# Patient Record
Sex: MALE | Race: WHITE
[De-identification: names, ages, dates, MRNs, and addresses within clinical notes are randomized per-mention and may not be internally consistent; named-entity substitution may affect disease eponyms.]

---

## 2018-01-25 ENCOUNTER — HOSPITAL ENCOUNTER (EMERGENCY)
Dept: HOSPITAL 25 - ED | Age: 23
Discharge: HOME | End: 2018-01-25
Payer: SELF-PAY

## 2018-01-25 VITALS — DIASTOLIC BLOOD PRESSURE: 65 MMHG | SYSTOLIC BLOOD PRESSURE: 125 MMHG

## 2018-01-25 DIAGNOSIS — S68.110A: Primary | ICD-10-CM
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
## 2018-01-25 PROCEDURE — 96375 TX/PRO/DX INJ NEW DRUG ADDON: CPT

## 2018-01-25 PROCEDURE — 99283 EMERGENCY DEPT VISIT LOW MDM: CPT

## 2018-01-25 PROCEDURE — 96374 THER/PROPH/DIAG INJ IV PUSH: CPT

## 2018-01-25 NOTE — HP
PREOPERATIVE HISTORY AND PHYSICAL:

 

DATE OF SURGERY/ADMISSION:  01/26/18

 

DATE OF OFFICE VISIT/ENCOUNTER:  01/25/18

 

ATTENDING SURGEON:  Citlali Baldwin MD * (DICTATED BY ALISE TUCKER)

 

PROCEDURE:  Right index finger revision amputation.

 

CHIEF COMPLAINT:  Traumatic partial amputation, right index finger.

 

HISTORY OF PRESENT ILLNESS:  This is a 22-year-old male who sustained injury to 
his right index finger while he was at work this morning, 01/25/18.  He was 
seen at [facility] after the injury.  The patient 
works on a farm and he accidently cut his right index finger partially off with 
a saw.  He had x- rays that show a complete loss of his distal phalanx.  He was 
given a dose of antibiotics at the emergency room and referred to Dr. Baldwin for 
further evaluation and treatment.  He was prescribed Keflex and oxycodone.  
After exam by and discussion with Dr. Baldwin, the patient has consented to 
proceed with surgical intervention at this time in form of a right index finger 
revision amputation.

 

PAST MEDICAL HISTORY:  History of back injury.

 

PAST SURGICAL HISTORY:  None.

 

CURRENT MEDICATIONS:  None.

 

ALLERGIES:  No known drug allergies.

 

FAMILY MEDICAL HISTORY:  Significant for history of blood clots and stroke.

 

SOCIAL HISTORY:  The patient is employed as a farmer and also with the 
auxiliary police.  He denies tobacco use or recreational drug use.  He drinks 
alcohol on rare occasion.

 

REVIEW OF SYSTEMS:  General:  Negative for fevers, chills, or night sweats.  No 
known anesthesia problems.  HEENT:  Negative for headache, lightheadedness, or 
syncopal episodes.  Integumentary:  Negative for abrasions, lesions, or open 
wounds.  Cardiothoracic:  Negative for hypertension, chest pain, palpitations, 
or edema.  Pulmonary: Negative for shortness of breath with exertion, chronic 
cough, COPD.  GI:  Negative for nausea, vomiting, diarrhea, constipation, or 
GERD.  : Negative for nocturia, urinary frequency, urgency, history of UTIs, 
or kidney problems.  Musculoskeletal:  Positive for current complaint. Positive 
for some recurring back pain. Neurological: Negative for paresthesias, numbness
, history of seizure, stroke, or epilepsy.  Endocrine:  Negative for diabetes 
or thyroid issues.  Hematologic:  Negative for easy bruising, anemia, excessive 
bleeding, or history of DVT.  Infectious Disease:  Negative for history of MRSA
, hepatitis C, or HIV.

 

                               PHYSICAL EXAMINATION

 

GENERAL:  Well-developed, well-nourished 22-year-old male in no acute distress.

 

VITAL SIGNS:  Height 5 feet 8 inches, weight 160 pounds, pulse rate 84.

 

HEENT:  Normocephalic, atraumatic.  Pupils are equal, round, and reactive to 
light and accommodation. Extraocular movements are intact.  Throat is clear.

 

NECK:  Supple. No palpable lymph nodes.

 

PULMONARY:  Lungs are clear to auscultation bilaterally.  No wheezes, rales, or 
rhonchi.

 

CARDIOTHORACIC:  Regular rate and rhythm.  S1, S2.  No murmurs, rubs, or 
gallops. No edema.

 

ABDOMEN:  Positive bowel sounds, soft, nontender.

 

NEUROLOGICAL:  Alert and oriented x3.  Cranial nerves II through XII are 
intact. Sensation is intact to light touch.

 

MUSCULOSKELETAL:  On exam of his right index finger, he has an oblique 
laceration starting at the radial aspect of the PIP joint and at the DIP joint.
  It is a clean cut.  There is exposed bone.  It is very tender to palpation.

 

 IMAGING STUDIES:  X-rays of the right index finger show complete loss of a 
distal phalanx.

 

IMPRESSION:  Right index finger traumatic pressure amputation.

 

PLAN:  The patient is scheduled to undergo a right index finger revision 
amputation with Dr. Baldwin on 01/26/18.  He will return to the office 10 to 14 
days postop for followup.  He has prescriptions from the emergency room for 
Percocet for postoperative pain management and will also continue to take his 
Keflex as prescribed.

 

 ____________________________________ ALISE TUCKER

 

[id number]/[facility] #: [id number]

[facility]

## 2018-01-25 NOTE — RAD
HISTORY: Amputation of right second digit



COMPARISONS: December 14, 2016



VIEWS: 3, Frontal, lateral, and oblique views of the right hand



FINDINGS:



BONE DENSITY: Normal.

BONES: There has been near complete amputation of the distal phalanx of the second digit

with a small amount of the base of the distal phalanx noted along the ulnar aspect. There

has been partial or dilatation of the head of the middle phalanx of the second digit.    

JOINTS: There is no arthropathy.    

ALIGNMENT: There is no dislocation. 

SOFT TISSUES: There is associated soft tissue defect.



OTHER FINDINGS: None.



IMPRESSION: 

FINDINGS CONSISTENT WITH THE HISTORY OF AMPUTATION OF THE DISTAL SECOND DIGIT. THERE IS A

SMALL RESIDUAL BONE FRAGMENT OF THE DISTAL PHALANX. THERE HAS BEEN PARTIAL RESECTION OF

THE HEAD OF THE MIDDLE PHALANX.

## 2018-01-26 ENCOUNTER — HOSPITAL ENCOUNTER (OUTPATIENT)
Dept: HOSPITAL 25 - OREAST | Age: 23
Discharge: HOME | End: 2018-01-26
Attending: ORTHOPAEDIC SURGERY
Payer: SELF-PAY

## 2018-01-26 VITALS — SYSTOLIC BLOOD PRESSURE: 123 MMHG | DIASTOLIC BLOOD PRESSURE: 72 MMHG

## 2018-01-26 DIAGNOSIS — R11.10: ICD-10-CM
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
## 2018-01-26 DIAGNOSIS — S68.620A: Primary | ICD-10-CM

## 2018-01-26 NOTE — OP
DATE OF OPERATION:  01/26/18 [facility]

 

DATE OF BIRTH:  [birth year]

 

SURGEON:  Citlali Baldwin MD

 

ASSISTANT:  ALISE Richards

 

ANESTHESIOLOGIST:  Rissa Hawley MD



ANESTHESIA:  General.

 

PRE-OP DIAGNOSIS:  Partial amputation of the right index finger.

 

POST-OP DIAGNOSIS:  Partial amputation of the right index finger.

 

OPERATIVE PROCEDURE:  Revision amputation of right index finger.

 

ESTIMATED BLOOD LOSS:  Zero.

 

TOURNIQUET TIME:  About 15 minutes.

 

INDICATIONS FOR PROCEDURE:  Josh is a 22-year-old male who injured his right 
index finger yesterday; he cut a  portion of it off with a circular saw.  He 
presents for revision amputation.

 

DESCRIPTION OF PROCEDURE:  The patient was brought to the operating room and 
given a general anesthetic and placed in a supine position on the operating 
table with a tourniquet around his right upper arm.  The skin of his right 
upper extremity was prepped and draped in the usual sterile fashion.  The hand 
and forearm were exsanguinated and the tourniquet elevated to 250 mmHg.  The 
distal phalanx bony fragment was removed sharply with a knife and then the 
distal aspect of the middle phalanx was subperiosteally dissected and then 
removed with a bone cutter.  About half of the middle phalanx was removed in 
order to gain closure with the skin flap. The wound was copiously irrigated 
with saline and then the skin flap was closed over the remaining aspect of the 
middle phalanx.  A tension-free closure was accomplished.  The skin edges were 
reapproximated with 4-0 nylon suture.  The wound was dressed with Xeroform, 4x4
, Webril, and a splint.  The patient tolerated the procedure well and was 
brought to the recovery room in good condition.

 

 [id number]/[facility] #: [id number]

MTDD